# Patient Record
Sex: FEMALE
[De-identification: names, ages, dates, MRNs, and addresses within clinical notes are randomized per-mention and may not be internally consistent; named-entity substitution may affect disease eponyms.]

---

## 2020-11-18 ENCOUNTER — APPOINTMENT (OUTPATIENT)
Dept: PULMONOLOGY | Facility: CLINIC | Age: 40
End: 2020-11-18
Payer: MEDICAID

## 2020-11-18 DIAGNOSIS — R30.0 DYSURIA: ICD-10-CM

## 2020-11-18 PROBLEM — Z00.00 ENCOUNTER FOR PREVENTIVE HEALTH EXAMINATION: Status: ACTIVE | Noted: 2020-11-18

## 2020-11-18 PROCEDURE — 99204 OFFICE O/P NEW MOD 45 MIN: CPT | Mod: 95

## 2020-11-19 ENCOUNTER — NON-APPOINTMENT (OUTPATIENT)
Age: 40
End: 2020-11-19

## 2020-11-19 PROBLEM — R30.0 DYSURIA: Status: ACTIVE | Noted: 2020-11-19

## 2020-11-20 ENCOUNTER — LABORATORY RESULT (OUTPATIENT)
Age: 40
End: 2020-11-20

## 2020-11-20 ENCOUNTER — APPOINTMENT (OUTPATIENT)
Dept: PULMONOLOGY | Facility: CLINIC | Age: 40
End: 2020-11-20
Payer: MEDICAID

## 2020-11-20 ENCOUNTER — NON-APPOINTMENT (OUTPATIENT)
Age: 40
End: 2020-11-20

## 2020-11-20 LAB
ALBUMIN SERPL ELPH-MCNC: 4.2 G/DL
ALP BLD-CCNC: 122 U/L
ALT SERPL-CCNC: 270 U/L
ANION GAP SERPL CALC-SCNC: 16 MMOL/L
AST SERPL-CCNC: 104 U/L
BASOPHILS # BLD AUTO: 0.02 K/UL
BASOPHILS NFR BLD AUTO: 0.2 %
BILIRUB SERPL-MCNC: <0.2 MG/DL
BUN SERPL-MCNC: 13 MG/DL
CALCIUM SERPL-MCNC: 9.7 MG/DL
CHLORIDE SERPL-SCNC: 103 MMOL/L
CO2 SERPL-SCNC: 18 MMOL/L
CREAT SERPL-MCNC: 0.72 MG/DL
CRP SERPL-MCNC: <0.1 MG/DL
DEPRECATED D DIMER PPP IA-ACNC: <150 NG/ML DDU
EOSINOPHIL # BLD AUTO: 0 K/UL
EOSINOPHIL NFR BLD AUTO: 0 %
GLUCOSE SERPL-MCNC: 181 MG/DL
HCT VFR BLD CALC: 40.4 %
HGB BLD-MCNC: 13.9 G/DL
IMM GRANULOCYTES NFR BLD AUTO: 0.9 %
LYMPHOCYTES # BLD AUTO: 2.43 K/UL
LYMPHOCYTES NFR BLD AUTO: 30.2 %
MAN DIFF?: NORMAL
MCHC RBC-ENTMCNC: 31.1 PG
MCHC RBC-ENTMCNC: 34.4 GM/DL
MCV RBC AUTO: 90.4 FL
MONOCYTES # BLD AUTO: 0.44 K/UL
MONOCYTES NFR BLD AUTO: 5.5 %
NEUTROPHILS # BLD AUTO: 5.08 K/UL
NEUTROPHILS NFR BLD AUTO: 63.2 %
PLATELET # BLD AUTO: 294 K/UL
POTASSIUM SERPL-SCNC: 4.9 MMOL/L
PROT SERPL-MCNC: 7.3 G/DL
RBC # BLD: 4.47 M/UL
RBC # FLD: 12.8 %
SARS-COV-2 IGG SERPL IA-ACNC: 2.64 INDEX
SARS-COV-2 IGG SERPL QL IA: POSITIVE
SODIUM SERPL-SCNC: 138 MMOL/L
WBC # FLD AUTO: 8.04 K/UL

## 2020-11-20 PROCEDURE — 99213 OFFICE O/P EST LOW 20 MIN: CPT | Mod: 95

## 2020-11-20 NOTE — HISTORY OF PRESENT ILLNESS
[Home] : at home, [unfilled] , at the time of the visit. [Medical Office: ([facility])___] : at the medical office located in  [Verbal consent obtained from patient] : the patient, [unfilled] [FreeTextEntry1] : Covid f/u\par \par Still with same complaints, HA, body aches, chest ache, neuro\par Also, now noting to me that she was bradycardic while admitted, HR not above 60, which persists.\par Sounds like she had an EKG and CT in the hospital,not sure what else.\par Afebrile . Normal pulse ox. \par Labs -- normal WBC. Negative d dimer. Normal creatinine. Normal CRP.  AST//270 and ALk phos 122 - per pt, her LFT were up in the hospital as well. Was unable to give a urine sample\par \par Repeat labs today, will include Trop, CPK,and f/u liver enzymes\par Will arrange for cardiology evaluation\par We have already arranged for her to have neuro eval next week.\par Continue Homecare\par \par

## 2020-11-21 LAB
ALBUMIN SERPL ELPH-MCNC: 4.2 G/DL
ALP BLD-CCNC: 108 U/L
ALT SERPL-CCNC: 361 U/L
ANION GAP SERPL CALC-SCNC: 13 MMOL/L
AST SERPL-CCNC: 210 U/L
BASOPHILS # BLD AUTO: 0.03 K/UL
BASOPHILS NFR BLD AUTO: 0.3 %
BILIRUB SERPL-MCNC: 0.3 MG/DL
BUN SERPL-MCNC: 17 MG/DL
CALCIUM SERPL-MCNC: 9.5 MG/DL
CHLORIDE SERPL-SCNC: 101 MMOL/L
CO2 SERPL-SCNC: 25 MMOL/L
CREAT SERPL-MCNC: 0.56 MG/DL
EOSINOPHIL # BLD AUTO: 0.11 K/UL
EOSINOPHIL NFR BLD AUTO: 1 %
FERRITIN SERPL-MCNC: 192 NG/ML
GLUCOSE SERPL-MCNC: 138 MG/DL
HCT VFR BLD CALC: 43.4 %
HGB BLD-MCNC: 14.5 G/DL
IMM GRANULOCYTES NFR BLD AUTO: 0.5 %
LYMPHOCYTES # BLD AUTO: 6.45 K/UL
LYMPHOCYTES NFR BLD AUTO: 56.1 %
MAN DIFF?: NORMAL
MCHC RBC-ENTMCNC: 30.8 PG
MCHC RBC-ENTMCNC: 33.4 GM/DL
MCV RBC AUTO: 92.1 FL
MONOCYTES # BLD AUTO: 1.09 K/UL
MONOCYTES NFR BLD AUTO: 9.5 %
NEUTROPHILS # BLD AUTO: 3.76 K/UL
NEUTROPHILS NFR BLD AUTO: 32.6 %
PLATELET # BLD AUTO: 320 K/UL
POTASSIUM SERPL-SCNC: 3.7 MMOL/L
PROCALCITONIN SERPL-MCNC: 0.05 NG/ML
PROCALCITONIN SERPL-MCNC: 0.1 NG/ML
PROT SERPL-MCNC: 7.2 G/DL
RBC # BLD: 4.71 M/UL
RBC # FLD: 12.7 %
SODIUM SERPL-SCNC: 139 MMOL/L
TROPONIN I SERPL-MCNC: <0.01 NG/ML
WBC # FLD AUTO: 11.5 K/UL

## 2020-11-23 ENCOUNTER — NON-APPOINTMENT (OUTPATIENT)
Age: 40
End: 2020-11-23

## 2020-11-23 ENCOUNTER — LABORATORY RESULT (OUTPATIENT)
Age: 40
End: 2020-11-23

## 2020-11-23 ENCOUNTER — APPOINTMENT (OUTPATIENT)
Dept: PULMONOLOGY | Facility: CLINIC | Age: 40
End: 2020-11-23
Payer: MEDICAID

## 2020-11-23 PROCEDURE — 99442: CPT

## 2020-11-23 NOTE — HISTORY OF PRESENT ILLNESS
[TextBox_4] : Video via [employer]\par \par 40-year-old woman referred for pulmonary consultation, CROWN (Coronavirus - Related Outpatient Work Navigators) Program\par \par Patient was diagnosed with Covid on November 9.  She was admitted to [facility] from November 11 through November 16.  She had presented with symptoms of shortness of breath, hypoxemia, however did not require oxygen at discharge.  Was treated with steroids and prophylactic anticoagulation.  She was discharged with Eliquis 2.5 twice daily, and dexamethasone 6 mg, both of which she currently remains on.\par Overall, shortness of breath is improved.  She has no fevers.  Her predominant complaint right now is "exhaustion," and pain everywhere.  She is taking Tylenol for pain relief.  She has a pulse oximeter.  Oxygen saturations ranged from 95 to 96% on room air at rest, to 90 to 91% room air after ambulation, and quickly go back up to 96% when she rests.  Food tastes altered, she is not eating very much solid, currently drinking adequately.  She is trying to exert herself a little more convenient basis.\par \par She was discharged with home care services.  She said that a nurse has called or telehealth.  Thus far, has not seen her in person.\par \par Patient has a past medical history of any obesity, prediabetes, fibromyalgia, transaminitis.\par \par On video exam, patient is obese in no distress.  Mallampati 4.  Oxygen saturations 96% on room air at rest.  He is speaking clearly and intact sentences without any respiratory distress.\par \par Of note, patient reports that in March she had a very similar illness.  She was seen by her doctor, at that time routine testing for Covid was not been done that was presumed the diagnosis.  She never went for antibody testing either, so there is no way to know for sure if she actually had Covid or if it was another viral infection in early March.  In any case she is very concerned\par \par 40-year-old woman, obese, prediabetes, now day 10 post diagnosis Covid, and 2 days post discharge after hospital stay at [facility] from November 11-16.  She was discharged without oxygen requirements.  While she still desaturates from 96% to 91% with exertion, she does not go below 90%, and recovers quickly.  She is on appropriate medications, dexamethasone 6 mg and twice daily 2.5 mg of Eliquis.  She has no fever.\par I will arrange for an in person visit with [facility] for tomorrow\par I will arrange for home labs for tomorrow, noting our Covid panel (CBC, CMP, CRP, ferritin, procalcitonin, D-dimer) as well as Covid IgG\par She will continue to monitor her oxygen saturation at rest and with exertion.  I have provided her phone number to let us know right away if she is desaturating below 90%.\par I will follow-up with her tomorrow\par  [Other:____] : [unfilled] [Home] : at home, [unfilled] , at the time of the visit. [Medical Office: ([facility])___] : at the medical office located in  [Verbal consent obtained from patient] : the patient, [unfilled] [FreeTextEntry1] : Telephone visit - COVID follow up\par \par Still with fatigue, HA, body aches\par No sob.\par Pulse ox remains 98%\par HR still wont go above 60. Not on any b blocker, etc\par \par She has appt tomorrow with cardiology and neuro tomorrow\par \par spoke with referring PA -- \par per the [facility] d/c note she had bradycardia in the hospital, but did mention any w/u\par also d/c summary reported neg CT\par PA also said she has a h/o increase LFT, US with fatty liver.\par \par will follow\par

## 2020-11-23 NOTE — HISTORY OF PRESENT ILLNESS
[TextBox_4] : Video via [employer]\par \par 40-year-old woman referred for pulmonary consultation, CROWN (Coronavirus - Related Outpatient Work Navigators) Program\par \par Patient was diagnosed with Covid on November 9.  She was admitted to [facility] from November 11 through November 16.  She had presented with symptoms of shortness of breath, hypoxemia, however did not require oxygen at discharge.  Was treated with steroids and prophylactic anticoagulation.  She was discharged with Eliquis 2.5 twice daily, and dexamethasone 6 mg, both of which she currently remains on.\par Overall, shortness of breath is improved.  She has no fevers.  Her predominant complaint right now is "exhaustion," and pain everywhere.  She is taking Tylenol for pain relief.  She has a pulse oximeter.  Oxygen saturations ranged from 95 to 96% on room air at rest, to 90 to 91% room air after ambulation, and quickly go back up to 96% when she rests.  Food tastes altered, she is not eating very much solid, currently drinking adequately.  She is trying to exert herself a little more convenient basis.\par \par She was discharged with home care services.  She said that a nurse has called or telehealth.  Thus far, has not seen her in person.\par \par Patient has a past medical history of any obesity, prediabetes, fibromyalgia, transaminitis.\par \par On video exam, patient is obese in no distress.  Mallampati 4.  Oxygen saturations 96% on room air at rest.  He is speaking clearly and intact sentences without any respiratory distress.\par \par Of note, patient reports that in March she had a very similar illness.  She was seen by her doctor, at that time routine testing for Covid was not been done that was presumed the diagnosis.  She never went for antibody testing either, so there is no way to know for sure if she actually had Covid or if it was another viral infection in early March.  In any case she is very concerned\par \par 40-year-old woman, obese, prediabetes, now day 10 post diagnosis Covid, and 2 days post discharge after hospital stay at [facility] from November 11-16.  She was discharged without oxygen requirements.  While she still desaturates from 96% to 91% with exertion, she does not go below 90%, and recovers quickly.  She is on appropriate medications, dexamethasone 6 mg and twice daily 2.5 mg of Eliquis.  She has no fever.\par I will arrange for an in person visit with [facility] for tomorrow\par I will arrange for home labs for tomorrow, noting our Covid panel (CBC, CMP, CRP, ferritin, procalcitonin, D-dimer) as well as Covid IgG\par She will continue to monitor her oxygen saturation at rest and with exertion.  I have provided her phone number to let us know right away if she is desaturating below 90%.\par I will follow-up with her tomorrow\par  [Other:____] : [unfilled] [Home] : at home, [unfilled] , at the time of the visit. [Medical Office: ([facility])___] : at the medical office located in  [Verbal consent obtained from patient] : the patient, [unfilled] [FreeTextEntry1] : Telephone visit - COVID follow up\par \par Still with fatigue, HA, body aches\par No sob.\par Pulse ox remains 98%\par HR still wont go above 60. Not on any b blocker, etc\par \par She has appt tomorrow with cardiology and neuro tomorrow\par \par spoke with referring PA -- \par per the [facility] d/c note she had bradycardia in the hospital, but did mention any w/u\par also d/c summary reported neg CT\par PA also said she has a h/o increase LFT, US with fatty liver.\par \par will follow\par

## 2020-11-23 NOTE — HISTORY OF PRESENT ILLNESS
[TextBox_4] : Video via [employer]\par \par 40-year-old woman referred for pulmonary consultation, CROWN (Coronavirus - Related Outpatient Work Navigators) Program\par \par Patient was diagnosed with Covid on November 9.  She was admitted to [facility] from November 11 through November 16.  She had presented with symptoms of shortness of breath, hypoxemia, however did not require oxygen at discharge.  Was treated with steroids and prophylactic anticoagulation.  She was discharged with Eliquis 2.5 twice daily, and dexamethasone 6 mg, both of which she currently remains on.\par Overall, shortness of breath is improved.  She has no fevers.  Her predominant complaint right now is "exhaustion," and pain everywhere.  She is taking Tylenol for pain relief.  She has a pulse oximeter.  Oxygen saturations ranged from 95 to 96% on room air at rest, to 90 to 91% room air after ambulation, and quickly go back up to 96% when she rests.  Food tastes altered, she is not eating very much solid, currently drinking adequately.  She is trying to exert herself a little more convenient basis.\par \par She was discharged with home care services.  She said that a nurse has called or telehealth.  Thus far, has not seen her in person.\par \par Patient has a past medical history of any obesity, prediabetes, fibromyalgia, transaminitis.\par \par On video exam, patient is obese in no distress.  Mallampati 4.  Oxygen saturations 96% on room air at rest.  He is speaking clearly and intact sentences without any respiratory distress.\par \par Of note, patient reports that in March she had a very similar illness.  She was seen by her doctor, at that time routine testing for Covid was not been done that was presumed the diagnosis.  She never went for antibody testing either, so there is no way to know for sure if she actually had Covid or if it was another viral infection in early March.  In any case she is very concerned\par \par 40-year-old woman, obese, prediabetes, now day 10 post diagnosis Covid, and 2 days post discharge after hospital stay at [facility] from November 11-16.  She was discharged without oxygen requirements.  While she still desaturates from 96% to 91% with exertion, she does not go below 90%, and recovers quickly.  She is on appropriate medications, dexamethasone 6 mg and twice daily 2.5 mg of Eliquis.  She has no fever.\par I will arrange for an in person visit with [facility] for tomorrow\par I will arrange for home labs for tomorrow, noting our Covid panel (CBC, CMP, CRP, ferritin, procalcitonin, D-dimer) as well as Covid IgG\par She will continue to monitor her oxygen saturation at rest and with exertion.  I have provided her phone number to let us know right away if she is desaturating below 90%.\par I will follow-up with her tomorrow\par  [Other:____] : [unfilled] [Home] : at home, [unfilled] , at the time of the visit. [Medical Office: ([facility])___] : at the medical office located in  [Verbal consent obtained from patient] : the patient, [unfilled] [FreeTextEntry1] : Telephone visit - COVID follow up\par \par Still with fatigue, HA, body aches\par No sob.\par Pulse ox remains 98%\par HR still wont go above 60. Not on any b blocker, etc\par \par She has appt tomorrow with cardiology and neuro tomorrow\par \par spoke with referring PA -- \par per the [facility] d/c note she had bradycardia in the hospital, but did mention any w/u\par also d/c summary reported neg CT\par PA also said she has a h/o increase LFT, US with fatty liver.\par \par will follow\par

## 2020-11-23 NOTE — HISTORY OF PRESENT ILLNESS
[TextBox_4] : Video via [employer]\par \par 40-year-old woman referred for pulmonary consultation, CROWN (Coronavirus - Related Outpatient Work Navigators) Program\par \par Patient was diagnosed with Covid on November 9.  She was admitted to [facility] from November 11 through November 16.  She had presented with symptoms of shortness of breath, hypoxemia, however did not require oxygen at discharge.  Was treated with steroids and prophylactic anticoagulation.  She was discharged with Eliquis 2.5 twice daily, and dexamethasone 6 mg, both of which she currently remains on.\par Overall, shortness of breath is improved.  She has no fevers.  Her predominant complaint right now is "exhaustion," and pain everywhere.  She is taking Tylenol for pain relief.  She has a pulse oximeter.  Oxygen saturations ranged from 95 to 96% on room air at rest, to 90 to 91% room air after ambulation, and quickly go back up to 96% when she rests.  Food tastes altered, she is not eating very much solid, currently drinking adequately.  She is trying to exert herself a little more convenient basis.\par \par She was discharged with home care services.  She said that a nurse has called or telehealth.  Thus far, has not seen her in person.\par \par Patient has a past medical history of any obesity, prediabetes, fibromyalgia, transaminitis.\par \par On video exam, patient is obese in no distress.  Mallampati 4.  Oxygen saturations 96% on room air at rest.  He is speaking clearly and intact sentences without any respiratory distress.\par \par Of note, patient reports that in March she had a very similar illness.  She was seen by her doctor, at that time routine testing for Covid was not been done that was presumed the diagnosis.  She never went for antibody testing either, so there is no way to know for sure if she actually had Covid or if it was another viral infection in early March.  In any case she is very concerned\par \par 40-year-old woman, obese, prediabetes, now day 10 post diagnosis Covid, and 2 days post discharge after hospital stay at [facility] from November 11-16.  She was discharged without oxygen requirements.  While she still desaturates from 96% to 91% with exertion, she does not go below 90%, and recovers quickly.  She is on appropriate medications, dexamethasone 6 mg and twice daily 2.5 mg of Eliquis.  She has no fever.\par I will arrange for an in person visit with [facility] for tomorrow\par I will arrange for home labs for tomorrow, noting our Covid panel (CBC, CMP, CRP, ferritin, procalcitonin, D-dimer) as well as Covid IgG\par She will continue to monitor her oxygen saturation at rest and with exertion.  I have provided her phone number to let us know right away if she is desaturating below 90%.\par I will follow-up with her tomorrow\par  [Other:____] : [unfilled] [Home] : at home, [unfilled] , at the time of the visit. [Medical Office: ([facility])___] : at the medical office located in  [Verbal consent obtained from patient] : the patient, [unfilled] [FreeTextEntry1] : Telephone visit - COVID follow up\par \par Still with fatigue, HA, body aches\par No sob.\par Pulse ox remains 98%\par HR still wont go above 60. Not on any b blocker, etc\par \par She has appt tomorrow with cardiology and neuro tomorrow\par \par spoke with referring PA -- \par per the [facility] d/c note she had bradycardia in the hospital, but did mention any w/u\par also d/c summary reported neg CT\par PA also said she has a h/o increase LFT, US with fatty liver.\par \par will follow\par

## 2020-11-23 NOTE — HISTORY OF PRESENT ILLNESS
[FreeTextEntry1] : \par This visit was provided via telehealth using real-time 2-way audio visual technology. The patient, ANISH DENNISON, was located at home, [address] [place] [address] , at the time of the visit. \par The provider, LISKER, GITA, was located at the medical office located in  at the time of the visit. The patient, ANISH DENNISON and Provider participated in the telehealth encounter. \par Verbal consent for telehealth services was given on November 18, 2020 by the patient, ANISH DENNISON. \par Telephone visit - COVID follow up\par \par Still with fatigue, HA, body aches\par No sob.\par Pulse ox remains 98%\par HR still wont go above 60. Not on any b blocker, etc\par \par She has appt tomorrow with cardiology and neuro tomorrow\par \par spoke with referring PA -- \par per the [facility] d/c note she had bradycardia in the hospital, but did mention any w/u\par also d/c summary reported neg CT\par PA also said she has a h/o increase LFT, US with fatty liver.\par \par will follow\par \par  \par Active Problems\par COVID-19 (079.89) (U07.1)\par Dysuria (788.1) (R30.0)\par \par Discussion/Summary\par \par I have spent 15 minutes with the patient on the telephone. \par  \par Plan\par Urinalysis w/Reflex; Status:Active; Requested for:19Nov2020; \par \par  Electronically signed by : GITA LISKER, MD; Nov 23 2020  5:42PM EST (Author)\par \par

## 2020-11-24 LAB
APPEARANCE: ABNORMAL
BILIRUBIN URINE: NEGATIVE
BLOOD URINE: NEGATIVE
CK BB SERPL ELPH-CCNC: 0 % (ref 0–?)
CK MB CFR SERPL ELPH: 0 %
CK MM SERPL ELPH-CCNC: 100 %
COLOR: YELLOW
CREATINE KINASE,TOTAL,SERUM: 35 U/L
GLUCOSE QUALITATIVE U: NEGATIVE
KETONES URINE: NEGATIVE
LEUKOCYTE ESTERASE URINE: NEGATIVE
MACRO TYPE 1: 0 %
MACRO TYPE 2: 0 %
NITRITE URINE: NEGATIVE
PH URINE: 7
PROTEIN URINE: NEGATIVE
SPECIFIC GRAVITY URINE: 1.02
UROBILINOGEN URINE: NORMAL

## 2020-11-25 ENCOUNTER — NON-APPOINTMENT (OUTPATIENT)
Age: 40
End: 2020-11-25

## 2020-11-25 ENCOUNTER — APPOINTMENT (OUTPATIENT)
Dept: NEUROLOGY | Facility: CLINIC | Age: 40
End: 2020-11-25
Payer: MEDICAID

## 2020-11-25 ENCOUNTER — APPOINTMENT (OUTPATIENT)
Dept: HEART AND VASCULAR | Facility: CLINIC | Age: 40
End: 2020-11-25
Payer: MEDICAID

## 2020-11-25 VITALS
RESPIRATION RATE: 12 BRPM | HEIGHT: 56 IN | SYSTOLIC BLOOD PRESSURE: 110 MMHG | HEART RATE: 80 BPM | OXYGEN SATURATION: 97 % | DIASTOLIC BLOOD PRESSURE: 80 MMHG | BODY MASS INDEX: 44.76 KG/M2 | WEIGHT: 199 LBS

## 2020-11-25 DIAGNOSIS — R01.1 CARDIAC MURMUR, UNSPECIFIED: ICD-10-CM

## 2020-11-25 DIAGNOSIS — Z82.49 FAMILY HISTORY OF ISCHEMIC HEART DISEASE AND OTHER DISEASES OF THE CIRCULATORY SYSTEM: ICD-10-CM

## 2020-11-25 DIAGNOSIS — R07.9 CHEST PAIN, UNSPECIFIED: ICD-10-CM

## 2020-11-25 DIAGNOSIS — Z78.9 OTHER SPECIFIED HEALTH STATUS: ICD-10-CM

## 2020-11-25 PROCEDURE — 93306 TTE W/DOPPLER COMPLETE: CPT

## 2020-11-25 PROCEDURE — 93000 ELECTROCARDIOGRAM COMPLETE: CPT

## 2020-11-25 PROCEDURE — 99215 OFFICE O/P EST HI 40 MIN: CPT | Mod: 95

## 2020-11-25 PROCEDURE — 99204 OFFICE O/P NEW MOD 45 MIN: CPT

## 2020-11-25 RX ORDER — NORTRIPTYLINE HYDROCHLORIDE 25 MG/1
25 CAPSULE ORAL
Qty: 30 | Refills: 2 | Status: ACTIVE | COMMUNITY
Start: 2020-11-25 | End: 1900-01-01

## 2020-11-25 RX ORDER — APIXABAN 5 MG/1
5 TABLET, FILM COATED ORAL
Refills: 0 | Status: ACTIVE | COMMUNITY

## 2020-11-25 NOTE — REVIEW OF SYSTEMS
[Feeling Poorly] : feeling poorly [Feeling Tired] : feeling tired [Arm Weakness] : arm weakness [Fainting] : fainting [Heart Rate Is Slow] : slow heart rate [Negative] : Eyes

## 2020-11-25 NOTE — DISCUSSION/SUMMARY
[FreeTextEntry1] : 1. Chest pain: Likely secondary to pericarditis resulting from recent coronavirus. Reviewed hospital record as well as recent bloodwork drawn. all marker seemed to be improving. Patient reassured. Currently off anticoagulation encouraged to walk around. Advised to start a regimen of NSAIDs 800 mg of ibuprofen t.i.d. with a slow taper over the next 4 weeks. Will obtain a echocardiogram to assess for pericardial effusion.\par 2.cardiac murmur:echo

## 2020-11-25 NOTE — HISTORY OF PRESENT ILLNESS
[FreeTextEntry1] : 40-year-old female with no significant past medical history comes in for evaluation of chest pain. Patient recently hospitalized for 6 days with Corona virus received anticoagulation steroids. Patient did require significant supplemental oxygen. Patient found to have AST and ALT of 317 and 348 respectively and LDH of 454 ferritin level 592. Since discharge patient remains afebrile ever does complain of substernal chest pressure worse when lying down better when sitting up. No associated shortness of breath. Patient does complain of left-sided weakness which she complained about the hospital CT head was negative patient to follow up neurology. Patient did not receive remdesivir while in the hospital.

## 2020-11-25 NOTE — PHYSICAL EXAM
[General Appearance - Well Developed] : well developed [Normal Appearance] : normal appearance [Well Groomed] : well groomed [General Appearance - Well Nourished] : well nourished [No Deformities] : no deformities [General Appearance - In No Acute Distress] : no acute distress [Normal Oral Mucosa] : normal oral mucosa [No Oral Pallor] : no oral pallor [No Oral Cyanosis] : no oral cyanosis [Normal Jugular Venous A Waves Present] : normal jugular venous A waves present [Normal Jugular Venous V Waves Present] : normal jugular venous V waves present [No Jugular Venous Gomez A Waves] : no jugular venous gomez A waves [5th Left ICS - MCL] : palpated at the 5th LICS in the midclavicular line [Normal] : normal [Normal Rate] : normal [Rhythm Regular] : regular [Normal S1] : normal S1 [Normal S2] : normal S2 [III] : a grade 3 [Right Carotid Bruit] : no bruit heard over the right carotid [Left Carotid Bruit] : no bruit heard over the left carotid [No Pitting Edema] : no pitting edema present [Respiration, Rhythm And Depth] : normal respiratory rhythm and effort [Exaggerated Use Of Accessory Muscles For Inspiration] : no accessory muscle use [Auscultation Breath Sounds / Voice Sounds] : lungs were clear to auscultation bilaterally [Abdomen Soft] : soft [Abdomen Tenderness] : non-tender [Abdomen Mass (___ Cm)] : no abdominal mass palpated [Abnormal Walk] : normal gait [Gait - Sufficient For Exercise Testing] : the gait was sufficient for exercise testing [Nail Clubbing] : no clubbing of the fingernails [Cyanosis, Localized] : no localized cyanosis [Petechial Hemorrhages (___cm)] : no petechial hemorrhages [] : no ischemic changes [Oriented To Time, Place, And Person] : oriented to person, place, and time [Affect] : the affect was normal [Mood] : the mood was normal [No Anxiety] : not feeling anxious

## 2020-11-30 NOTE — REASON FOR VISIT
[Home] : at home, [unfilled] , at the time of the visit. [Medical Office: ([facility])___] : at the medical office located in  [Verbal consent obtained from patient] : the patient, [unfilled] [Initial Evaluation] : an initial evaluation [FreeTextEntry1] : Headache

## 2020-11-30 NOTE — HISTORY OF PRESENT ILLNESS
[FreeTextEntry1] : patient reports she was in her usual state of health until  11.9.20 when she started to develop HA, body ached, went to pmd and did covid test, quarantined at home. on 11.10 was admitted to [facility]. She reports she was tx with oxygen and steroids and eloquis which she is still on. taper to finish today.  Has been having neck pain, constant HA Photophobia, phonophobia, feels chest  pressure/shocks that goes to left side of body. Reports these symptoms was  aslo present in hospital. Per PMD, Carlos Eduardo, pt had Head CT which was normal.  Denies any changes in speech, numbness, double vision. seeing cardiology today\par she reports she has not been sleeping well.   takes ibuprofen with some improvement but HA returns.\par \par Has also been forgetful.

## 2020-11-30 NOTE — PHYSICAL EXAM
[General Appearance - Alert] : alert [Oriented To Time, Place, And Person] : oriented to person, place, and time [Person] : oriented to person [Place] : oriented to place [Time] : oriented to time [Short Term Intact] : short term memory intact [Motor Handedness Right-Handed] : the patient is right hand dominant [General Appearance - Well Nourished] : well nourished [] : normal voice and communication [FreeTextEntry6] : YUMIKO. Face symmetrical, raises eye brows symmelically. tongue midline, speech is clear, no pronator drift.  unable to assess weakness, but is able to maintain b/l arms stretched w [FreeTextEntry8] : gait appears steady un assisted  [FreeTextEntry1] : eyes tracks

## 2020-11-30 NOTE — ASSESSMENT
[FreeTextEntry1] : 41 yo f with daily HA since hospitalization for covid 19 now likely otc less responsive.   \par Nortriptylline 25mg,qhs, s/e discussed\par ubrevly for abortive which she can take daily for 5 days\par Brain MRI \par obtain records\par rtc in office in 1-2 weeks.

## 2020-12-04 ENCOUNTER — APPOINTMENT (OUTPATIENT)
Dept: INTERNAL MEDICINE | Facility: CLINIC | Age: 40
End: 2020-12-04

## 2020-12-08 ENCOUNTER — APPOINTMENT (OUTPATIENT)
Dept: NEUROLOGY | Facility: CLINIC | Age: 40
End: 2020-12-08
Payer: MEDICAID

## 2020-12-08 VITALS
HEIGHT: 60 IN | HEART RATE: 81 BPM | WEIGHT: 196 LBS | DIASTOLIC BLOOD PRESSURE: 73 MMHG | BODY MASS INDEX: 38.48 KG/M2 | SYSTOLIC BLOOD PRESSURE: 108 MMHG

## 2020-12-08 VITALS — TEMPERATURE: 97.7 F

## 2020-12-08 DIAGNOSIS — G44.52 NEW DAILY PERSISTENT HEADACHE (NDPH): ICD-10-CM

## 2020-12-08 DIAGNOSIS — U07.1 COVID-19: ICD-10-CM

## 2020-12-08 PROCEDURE — 99072 ADDL SUPL MATRL&STAF TM PHE: CPT

## 2020-12-08 PROCEDURE — 99214 OFFICE O/P EST MOD 30 MIN: CPT

## 2020-12-08 RX ORDER — RIMEGEPANT SULFATE 75 MG/75MG
75 TABLET, ORALLY DISINTEGRATING ORAL
Qty: 14 | Refills: 0 | Status: DISCONTINUED | COMMUNITY
Start: 2020-11-25 | End: 2020-12-08

## 2020-12-08 RX ORDER — IBUPROFEN 600 MG/1
600 TABLET, FILM COATED ORAL
Refills: 0 | Status: ACTIVE | COMMUNITY

## 2020-12-08 NOTE — ASSESSMENT
[FreeTextEntry1] : Headaches have improved on nortriptyline no changes to regime. left sided weakness, Brain MRI previously ordered \par Reported diminished hearing- refer to ENT\par She is to follow up with her PMD for elevated lfts\par compliance with treatment has been stressed.

## 2020-12-08 NOTE — PHYSICAL EXAM
[Person] : oriented to person [Place] : oriented to place [Time] : oriented to time [Short Term Intact] : short term memory intact [Remote Intact] : remote memory intact [Registration Intact] : recent registration memory intact [Concentration Intact] : normal concentrating ability [Visual Intact] : visual attention was ~T not ~L decreased [Naming Objects] : no difficulty naming common objects [Repeating Phrases] : no difficulty repeating a phrase [Writing A Sentence] : no difficulty writing a sentence [Fluency] : fluency intact [Comprehension] : comprehension intact [Reading] : reading intact [Past History] : adequate knowledge of personal past history [Cranial Nerves Optic (II)] : visual acuity intact bilaterally,  visual fields full to confrontation, pupils equal round and reactive to light [Cranial Nerves Oculomotor (III)] : extraocular motion intact [Cranial Nerves Trigeminal (V)] : facial sensation intact symmetrically [Cranial Nerves Facial (VII)] : face symmetrical [Cranial Nerves Glossopharyngeal (IX)] : tongue and palate midline [Cranial Nerves Accessory (XI - Cranial And Spinal)] : head turning and shoulder shrug symmetric [Cranial Nerves Hypoglossal (XII)] : there was no tongue deviation with protrusion [Motor Tone] : muscle tone was normal in all four extremities [No Muscle Atrophy] : normal bulk in all four extremities [Motor Handedness Right-Handed] : the patient is right hand dominant [Sensation Tactile Decrease] : light touch was intact [Abnormal Walk] : normal gait [Balance] : balance was intact [2+] : Ankle jerk left 2+ [Oriented To Time, Place, And Person] : oriented to person, place, and time [Affect] : the affect was normal [PERRL With Normal Accommodation] : pupils were equal in size, round, reactive to light, with normal accommodation [Extraocular Movements] : extraocular movements were intact [Full Visual Field] : full visual field [Tremor] : no tremor present [Coordination - Dysmetria Impaired Finger-to-Nose Bilateral] : not present [Coordination - Dysmetria Impaired Heel-to-Shin Bilateral] : not present [Plantar Reflex Right Only] : normal on the right [Plantar Reflex Left Only] : normal on the left [Arterial Pulses Carotid] : carotid pulses were normal with no bruits [FreeTextEntry1] : trap msk tenderness

## 2020-12-08 NOTE — HISTORY OF PRESENT ILLNESS
[FreeTextEntry1] : patient was last seen via Tele health. She has started Nortriptyline 25mg  with  no adverse effects. she reports she has been sleeping better and feels her body is "calmer". Headaches have improved. still feels left sided weakness since hospitalization in November and abd pain. will be seeing PMD today\par She reports having decreased hearing  in right ear and tinnitus.

## 2020-12-29 ENCOUNTER — APPOINTMENT (OUTPATIENT)
Dept: HEPATOLOGY | Facility: CLINIC | Age: 40
End: 2020-12-29
Payer: MEDICAID

## 2020-12-29 VITALS
TEMPERATURE: 97.5 F | BODY MASS INDEX: 38.28 KG/M2 | HEART RATE: 70 BPM | RESPIRATION RATE: 12 BRPM | HEIGHT: 60 IN | WEIGHT: 195 LBS | DIASTOLIC BLOOD PRESSURE: 55 MMHG | SYSTOLIC BLOOD PRESSURE: 112 MMHG

## 2020-12-29 PROCEDURE — 99203 OFFICE O/P NEW LOW 30 MIN: CPT

## 2020-12-29 PROCEDURE — 99072 ADDL SUPL MATRL&STAF TM PHE: CPT

## 2020-12-29 NOTE — HISTORY OF PRESENT ILLNESS
[de-identified] : 39 y/o F w/ hx of COVID infection s/p anticoagulation/steroids here for elevated liver tests and RUQ abdominal pain.\par No alcohol. FH of dad w/ ETOH cirrhosis on dialysis in [place] and FH of pancreatic cancer. \par Not taking any medications, no herbal medications. \par Recently had a US abdomen and was told she has fatty liver. \par She had COVID infection, hospitalized 11/9/20 for 10 days. She was never in the ICU. Stopped anticoagulation/steroids in mid December 2020.\par + RUQ pain going to the back, all day long, every day. She can no longer sleep on her R side. Has had it since her COVID diagnosis. Maybe less intense now.\par Never had a EGD.  She has been gaining weight. Beginning of 1/2020 she was 170 lbs. 12/29/20 - now weighs of 195 lbs. \par 11/20/20 BW - , , , TB 0.3

## 2020-12-29 NOTE — PHYSICAL EXAM
[Scleral Icterus] : No Scleral Icterus [Spider Angioma] : No spider angioma(s) were observed [Ascites Fluid Wave] : no ascites fluid wave [Ascites Tense] : ascites is not tense [Tender] : tender [Asterixis] : no asterixis observed [Jaundice] : No jaundice [General Appearance - Alert] : alert [General Appearance - In No Acute Distress] : in no acute distress [Outer Ear] : the ears and nose were normal in appearance [Oropharynx] : the oropharynx was normal [Neck Appearance] : the appearance of the neck was normal [Neck Cervical Mass (___cm)] : no neck mass was observed [Jugular Venous Distention Increased] : there was no jugular-venous distention [Thyroid Diffuse Enlargement] : the thyroid was not enlarged [Thyroid Nodule] : there were no palpable thyroid nodules [Heart Rate And Rhythm] : heart rate was normal and rhythm regular [Heart Sounds] : normal S1 and S2 [Heart Sounds Gallop] : no gallops [Murmurs] : no murmurs [Heart Sounds Pericardial Friction Rub] : no pericardial rub [Abdomen Soft] : soft [FreeTextEntry1] : Tender RUQ [Skin Color & Pigmentation] : normal skin color and pigmentation [Skin Turgor] : normal skin turgor [] : no rash [Oriented To Time, Place, And Person] : oriented to person, place, and time [Impaired Insight] : insight and judgment were intact [Affect] : the affect was normal

## 2020-12-29 NOTE — ASSESSMENT
[FreeTextEntry1] : 41 y/o F w/ hx of COVID infection s/p anticoagulation/steroids here for elevated liver tests and RUQ abdominal pain that has persisted despite COVID infection in November.\par \par 1. Elevated liver enzymes 2/2 COVID, perhaps increased due to concomitant fatty liver\par - recheck labs, finish work-up. Fibroscan ordered. Obtain RUQ US results.\par - will likely need to obtain CT scan \par \par RTC 1 month

## 2020-12-30 ENCOUNTER — NON-APPOINTMENT (OUTPATIENT)
Age: 40
End: 2020-12-30

## 2020-12-30 DIAGNOSIS — K75.9 INFLAMMATORY LIVER DISEASE, UNSPECIFIED: ICD-10-CM

## 2020-12-30 LAB
ALBUMIN SERPL ELPH-MCNC: 4.4 G/DL
ALP BLD-CCNC: 87 U/L
ALT SERPL-CCNC: 149 U/L
ANA SER IF-ACNC: NEGATIVE
ANION GAP SERPL CALC-SCNC: 12 MMOL/L
AST SERPL-CCNC: 128 U/L
BASOPHILS # BLD AUTO: 0.05 K/UL
BASOPHILS NFR BLD AUTO: 0.7 %
BILIRUB SERPL-MCNC: 0.4 MG/DL
BUN SERPL-MCNC: 9 MG/DL
CALCIUM SERPL-MCNC: 9.4 MG/DL
CHLORIDE SERPL-SCNC: 106 MMOL/L
CK SERPL-CCNC: 58 U/L
CO2 SERPL-SCNC: 22 MMOL/L
CREAT SERPL-MCNC: 0.5 MG/DL
DEPRECATED KAPPA LC FREE/LAMBDA SER: 0.97 RATIO
EOSINOPHIL # BLD AUTO: 0.16 K/UL
EOSINOPHIL NFR BLD AUTO: 2.3 %
ESTIMATED AVERAGE GLUCOSE: 140 MG/DL
FERRITIN SERPL-MCNC: 197 NG/ML
GGT SERPL-CCNC: 77 U/L
GLUCOSE SERPL-MCNC: 153 MG/DL
HBA1C MFR BLD HPLC: 6.5 %
HBV CORE IGG+IGM SER QL: NONREACTIVE
HBV SURFACE AB SER QL: NONREACTIVE
HBV SURFACE AG SER QL: NONREACTIVE
HCT VFR BLD CALC: 40.5 %
HCV AB SER QL: NONREACTIVE
HCV S/CO RATIO: 0.16 S/CO
HEPATITIS A IGG ANTIBODY: REACTIVE
HGB BLD-MCNC: 13 G/DL
IGA SER QL IEP: 265 MG/DL
IGG SER QL IEP: 1408 MG/DL
IGM SER QL IEP: 132 MG/DL
IMM GRANULOCYTES NFR BLD AUTO: 0.1 %
INR PPP: 1.1 RATIO
IRON SATN MFR SERPL: 30 %
IRON SERPL-MCNC: 123 UG/DL
KAPPA LC CSF-MCNC: 2.41 MG/DL
KAPPA LC SERPL-MCNC: 2.34 MG/DL
LPL SERPL-CCNC: 48 U/L
LYMPHOCYTES # BLD AUTO: 3.68 K/UL
LYMPHOCYTES NFR BLD AUTO: 54 %
MAN DIFF?: NORMAL
MCHC RBC-ENTMCNC: 31 PG
MCHC RBC-ENTMCNC: 32.1 GM/DL
MCV RBC AUTO: 96.4 FL
MONOCYTES # BLD AUTO: 0.47 K/UL
MONOCYTES NFR BLD AUTO: 6.9 %
NEUTROPHILS # BLD AUTO: 2.44 K/UL
NEUTROPHILS NFR BLD AUTO: 36 %
PLATELET # BLD AUTO: 210 K/UL
POTASSIUM SERPL-SCNC: 3.7 MMOL/L
PROT SERPL-MCNC: 7.1 G/DL
PT BLD: 12.9 SEC
RBC # BLD: 4.2 M/UL
RBC # FLD: 12.8 %
SMOOTH MUSCLE AB SER QL IF: NORMAL
SODIUM SERPL-SCNC: 140 MMOL/L
TIBC SERPL-MCNC: 404 UG/DL
TSH SERPL-ACNC: 0.86 UIU/ML
TTG IGA SER IA-ACNC: <1.2 U/ML
TTG IGA SER-ACNC: NEGATIVE
UIBC SERPL-MCNC: 281 UG/DL
WBC # FLD AUTO: 6.81 K/UL

## 2020-12-31 LAB
SARS-COV-2 IGG SERPL IA-ACNC: 4.54 INDEX
SARS-COV-2 IGG SERPL QL IA: POSITIVE

## 2021-01-04 LAB
IGG4 SER-MCNC: 19 MG/DL
LKM AB SER QL IF: <20.1 UNITS

## 2021-01-06 ENCOUNTER — NON-APPOINTMENT (OUTPATIENT)
Age: 41
End: 2021-01-06

## 2021-01-08 ENCOUNTER — APPOINTMENT (OUTPATIENT)
Dept: NEUROLOGY | Facility: CLINIC | Age: 41
End: 2021-01-08

## 2021-03-11 PROBLEM — K75.9 HEPATITIS: Status: ACTIVE | Noted: 2020-12-29

## 2021-04-14 ENCOUNTER — APPOINTMENT (OUTPATIENT)
Dept: HEPATOLOGY | Facility: CLINIC | Age: 41
End: 2021-04-14